# Patient Record
Sex: MALE | Race: WHITE | NOT HISPANIC OR LATINO | Employment: UNEMPLOYED | URBAN - METROPOLITAN AREA
[De-identification: names, ages, dates, MRNs, and addresses within clinical notes are randomized per-mention and may not be internally consistent; named-entity substitution may affect disease eponyms.]

---

## 2022-07-25 ENCOUNTER — LAB REQUISITION (OUTPATIENT)
Dept: LAB | Facility: HOSPITAL | Age: 32
End: 2022-07-25

## 2022-07-25 DIAGNOSIS — R11.2 NAUSEA WITH VOMITING, UNSPECIFIED: ICD-10-CM

## 2022-07-25 DIAGNOSIS — R63.4 ABNORMAL WEIGHT LOSS: ICD-10-CM

## 2022-07-25 DIAGNOSIS — R10.84 GENERALIZED ABDOMINAL PAIN: ICD-10-CM

## 2022-07-25 PROCEDURE — 88342 IMHCHEM/IMCYTCHM 1ST ANTB: CPT | Performed by: INTERNAL MEDICINE

## 2022-07-25 PROCEDURE — 88305 TISSUE EXAM BY PATHOLOGIST: CPT | Performed by: INTERNAL MEDICINE

## 2022-07-26 LAB
LAB AP CASE REPORT: NORMAL
PATH REPORT.FINAL DX SPEC: NORMAL
PATH REPORT.GROSS SPEC: NORMAL

## 2024-04-03 ENCOUNTER — OFFICE VISIT (OUTPATIENT)
Dept: PSYCHIATRY | Facility: CLINIC | Age: 34
End: 2024-04-03
Payer: MEDICAID

## 2024-04-03 ENCOUNTER — PATIENT ROUNDING (BHMG ONLY) (OUTPATIENT)
Dept: PSYCHIATRY | Facility: CLINIC | Age: 34
End: 2024-04-03
Payer: MEDICAID

## 2024-04-03 DIAGNOSIS — F31.81 BIPOLAR II DISORDER: Primary | Chronic | ICD-10-CM

## 2024-04-03 DIAGNOSIS — F41.1 GENERALIZED ANXIETY DISORDER: Chronic | ICD-10-CM

## 2024-04-03 RX ORDER — TRAZODONE HYDROCHLORIDE 100 MG/1
100 TABLET ORAL NIGHTLY PRN
COMMUNITY
Start: 2024-02-19

## 2024-04-03 RX ORDER — GABAPENTIN 600 MG/1
1 TABLET ORAL 3 TIMES DAILY
COMMUNITY
Start: 2024-02-20

## 2024-04-03 RX ORDER — HYDROXYZINE 50 MG/1
TABLET, FILM COATED ORAL
COMMUNITY
Start: 2024-03-05

## 2024-04-03 RX ORDER — MELOXICAM 15 MG/1
TABLET ORAL
COMMUNITY
Start: 2024-03-31

## 2024-04-03 RX ORDER — ARIPIPRAZOLE 5 MG/1
5 TABLET ORAL DAILY
Qty: 30 TABLET | Refills: 0 | Status: SHIPPED | OUTPATIENT
Start: 2024-04-03

## 2024-04-03 RX ORDER — PANTOPRAZOLE SODIUM 40 MG/1
1 TABLET, DELAYED RELEASE ORAL DAILY
COMMUNITY
Start: 2024-01-11

## 2024-04-03 RX ORDER — CITALOPRAM 40 MG/1
1 TABLET ORAL DAILY
COMMUNITY
Start: 2024-01-24

## 2024-04-03 RX ORDER — TIZANIDINE 4 MG/1
TABLET ORAL
COMMUNITY
Start: 2024-03-31

## 2024-04-03 NOTE — PROGRESS NOTES
Subjective   Lior Villalpando is a 34 y.o. male who presents today for initial evaluation     Chief Complaint:  depression anger anxiety     History of Present Illness: the pt reported issues with mood/anger and anxiety since he was a child, mood issues never been addressed and he was self medicating with different drugs    The pt was started on celexa by PCP, no improvement   Depression became worse recently,  rated as 9/10, denied AVH/SI/HI  Depression is associated with low self esteem, low E level, guilt feelings, insomnia  Anxiety is intense and persistent and associated with increased tension, irritability and unpleasant somatic sensations  The pt endorsed past episodes with increase E, irritability, decreased need for sleep, increased impulsivity, racing thoughts that lasted 3-4 days and not related to drug use     The pt experimented with different drugs in the past, was clean for 2 years and relapsed recently on meth  THC - daily use   Meth last use - 1 week ago   Alc - daily     The following portions of the patient's history were reviewed and updated as appropriate: allergies, current medications, past family history, past medical history, past social history, past surgical history and problem list.    PAST PSYCHIATRIC HISTORY  Axis I  No inpt , no attempts  Self mutilation (superficial wrist cut) , last - 2022   Axis II  Defer     PAST OUTPATIENT TREATMENT  Diagnosis treated:  No psych outpt, no psychotherapy    Treatment Type:  Meds from PCP   Prior Psychiatric Medications:  Celexa - now   Vraylar - increased anger and irritability   Support Groups:  Anger management as a teen (court ordered)   Sequelae Of Mental Disorder:  job disruption, family disruption, emotional distress          Interval History  Deteriorated    Side Effects  Denied       Past Medical History:  Past Medical History:   Diagnosis Date    Anxiety     Arrhythmia     Chronic pain disorder     Depression        Social History:  Social  History     Socioeconomic History    Marital status: Single   Tobacco Use    Smoking status: Every Day     Types: Cigarettes    Smokeless tobacco: Never   Vaping Use    Vaping status: Every Day    Substances: Nicotine    Devices: Disposable   Substance and Sexual Activity    Alcohol use: Yes     Alcohol/week: 20.0 standard drinks of alcohol     Types: 20 Cans of beer per week    Drug use: Yes     Types: Marijuana    Sexual activity: Defer   4 children 9, 8,4,3 yo   Unemployed   The pt used to work at the factory or retail in the past   The pt had DUI court coming up   The pt experimented with drugs in the past  Now daily THC and alc use and relapsed on meth recently, last use 1 week ago     Family History:  Family History   Problem Relation Age of Onset    Depression Mother     Anxiety disorder Mother        Past Surgical History:  History reviewed. No pertinent surgical history.    Problem List:  Patient Active Problem List   Diagnosis    Bipolar II disorder       Allergy:   No Known Allergies     Discontinued Medications:  There are no discontinued medications.    Current Medications:   Current Outpatient Medications   Medication Sig Dispense Refill    citalopram (CeleXA) 40 MG tablet Take 1 tablet by mouth Daily.      gabapentin (NEURONTIN) 600 MG tablet Take 1 tablet by mouth 3 times a day.      hydrOXYzine (ATARAX) 50 MG tablet TAKE 2 (TWO) TABLET THREE TIMES DAILY AS NEEDED      meloxicam (MOBIC) 15 MG tablet       pantoprazole (PROTONIX) 40 MG EC tablet Take 1 tablet by mouth Daily.      tiZANidine (ZANAFLEX) 4 MG tablet       traZODone (DESYREL) 100 MG tablet Take 1 tablet by mouth At Night As Needed.      ARIPiprazole (ABILIFY) 5 MG tablet Take 1 tablet by mouth Daily. 30 tablet 0     No current facility-administered medications for this visit.         Psychological ROS: positive for - anxiety, depression, and irritability  negative for - disorientation, hallucinations, memory difficulties, or suicidal  ideation      Physical Exam:   There were no vitals taken for this visit.    Mental Status Exam:   Hygiene:   good  Cooperation:  Cooperative  Eye Contact:  Fair  Psychomotor Behavior:  Appropriate  Affect:  Appropriate and Blunted  Mood: sad, depressed, and anxious  Hopelessness: Denies  Speech:  Normal  Thought Process:  Goal directed and Linear  Thought Content:  Mood congruent  Suicidal:  None  Homicidal:  None  Hallucinations:  None  Delusion:  None  Memory:  Intact  Orientation:  Person, Place, Time, and Situation  Reliability:  fair  Insight:  Fair  Judgement:  Fair  Impulse Control:  Poor  Physical/Medical Issues:  Yes          PHQ-9 Depression Screening    Little interest or pleasure in doing things? 3-->nearly every day   Feeling down, depressed, or hopeless? 3-->nearly every day   Trouble falling or staying asleep, or sleeping too much? 2-->more than half the days   Feeling tired or having little energy? 2-->more than half the days   Poor appetite or overeating? 1-->several days   Feeling bad about yourself - or that you are a failure or have let yourself or your family down? 3-->nearly every day   Trouble concentrating on things, such as reading the newspaper or watching television? 0-->not at all   Moving or speaking so slowly that other people could have noticed? Or the opposite - being so fidgety or restless that you have been moving around a lot more than usual? 0-->not at all   Thoughts that you would be better off dead, or of hurting yourself in some way? 0-->not at all   PHQ-9 Total Score 14   If you checked off any problems, how difficult have these problems made it for you to do your work, take care of things at home, or get along with other people? very difficult      Over the last two weeks, how often have you been bothered by the following problems?  Feeling nervous, anxious or on edge: Several days  Not being able to stop or control worrying: Several days  Worrying too much about different  things: Several days  Trouble Relaxing: More than half the days  Being so restless that it is hard to sit still: Several days  Becoming easily annoyed or irritable: Nearly every day  Feeling afraid as if something awful might happen: More than half the days  ERIC 7 Total Score: 11  If you checked any problems, how difficult have these problems made it for you to do your work, take care of things at home, or get along with other people: Very difficult      Current every day smoker 3-10 mintues spent counseling Not agreeable to stopping    I advised Lior of the risks of tobacco use.     Lab Results:   No visits with results within 3 Month(s) from this visit.   Latest known visit with results is:   Lab Requisition on 07/25/2022   Component Date Value Ref Range Status    Case Report 07/25/2022    Final                    Value:Surgical Pathology Report                         Case: [id number]                                  Authorizing Provider:  Darrick Spaulding MD        Collected:           07/25/2022 09:46 AM          Ordering Location:     [facility]       Received:            07/25/2022 02:06 PM                                 LABORATORY                                                                   Pathologist:           Abner Ramírez MD                                                             Specimen:    Gastric                                                                                    Final Diagnosis 07/25/2022    Final                    Value:This result contains rich text formatting which cannot be displayed here.    Gross Description 07/25/2022    Final                    Value:This result contains rich text formatting which cannot be displayed here.       Assessment & Plan   Problems Addressed this Visit       Bipolar II disorder - Primary    Relevant Medications    citalopram (CeleXA) 40 MG tablet    hydrOXYzine (ATARAX) 50 MG tablet    traZODone (DESYREL) 100 MG tablet     ARIPiprazole (ABILIFY) 5 MG tablet     Other Visit Diagnoses       Generalized anxiety disorder  (Chronic)       Relevant Medications    citalopram (CeleXA) 40 MG tablet    hydrOXYzine (ATARAX) 50 MG tablet    traZODone (DESYREL) 100 MG tablet    ARIPiprazole (ABILIFY) 5 MG tablet          Diagnoses         Codes Comments    Bipolar II disorder    -  Primary ICD-10-CM: F31.81  ICD-9-CM: 296.89     Generalized anxiety disorder     ICD-10-CM: F41.1  ICD-9-CM: 300.02             Visit Diagnoses:    ICD-10-CM ICD-9-CM   1. Bipolar II disorder  F31.81 296.89   2. Generalized anxiety disorder  F41.1 300.02       TREATMENT PLAN/GOALS: Continue supportive psychotherapy efforts and medications as indicated. Treatment and medication options discussed during today's visit. Patient ackowledged and verbally consented to continue with current treatment plan and was educated on the importance of compliance with treatment and follow-up appointments.    MEDICATION ISSUES:  INSPECT reviewed as expected - on gabapentin     PHQ scored 14 - moderate depression   ERIC 7 scored 11   MDQ scored 6   Patient screened positive for depression based on a PHQ-9 score of 14 on 4/3/2024. Follow-up recommendations include: Prescribed antidepressant medication treatment and mood stabilizers  .       Bipolar type 2 - start abilify 5 mg po QAM, consider AVALOS  maintenna or asimtufi to improve compliance   ERIC - cont celexa 40 mg and gabapentin (from PCP for pain)  Alc dependence - decrease amount of alc - harm reduction, consider vivitrol     The pt will benefit from therapy  AA/NA recommended     Short term goals - to establish therapeutic rapport   Long term goals - to improve sxs       Discussed medication options and treatment plan of prescribed medication as well as the risks, benefits, and side effects including potential falls, possible impaired driving and metabolic adversities among others. Patient is agreeable to call the office with any  worsening of symptoms or onset of side effects. Patient is agreeable to call 911 or go to the nearest ER should he/she begin having SI/HI. No medication side effects or related complaints today.     MEDS ORDERED DURING VISIT:  New Medications Ordered This Visit   Medications    ARIPiprazole (ABILIFY) 5 MG tablet     Sig: Take 1 tablet by mouth Daily.     Dispense:  30 tablet     Refill:  0       Return in about 4 weeks (around 5/1/2024).           This document has been electronically signed by Nayana Murritea MD  April 3, 2024 09:59 EDT    Part of this note may be an electronic transcription/translation of spoken language to printed text using the Dragon Dictation System.

## 2024-04-03 NOTE — PROGRESS NOTES
A My-chart message has been sent to the patient for PATIENT ROUNDING with [facility].

## 2024-04-12 DIAGNOSIS — F31.81 BIPOLAR II DISORDER: Chronic | ICD-10-CM

## 2024-04-12 RX ORDER — ARIPIPRAZOLE 5 MG/1
5 TABLET ORAL DAILY
Qty: 30 TABLET | Refills: 0 | Status: SHIPPED | OUTPATIENT
Start: 2024-04-12

## 2024-05-28 ENCOUNTER — TELEPHONE (OUTPATIENT)
Dept: PSYCHIATRY | Facility: CLINIC | Age: 34
End: 2024-05-28
Payer: MEDICAID

## 2024-05-28 DIAGNOSIS — F31.81 BIPOLAR II DISORDER: Chronic | ICD-10-CM

## 2024-05-29 RX ORDER — ARIPIPRAZOLE 5 MG/1
5 TABLET ORAL DAILY
Qty: 90 TABLET | Refills: 0 | Status: SHIPPED | OUTPATIENT
Start: 2024-05-29

## 2024-08-06 DIAGNOSIS — F31.81 BIPOLAR II DISORDER: Chronic | ICD-10-CM

## 2024-08-06 RX ORDER — ARIPIPRAZOLE 5 MG/1
5 TABLET ORAL DAILY
Qty: 30 TABLET | Refills: 0 | Status: SHIPPED | OUTPATIENT
Start: 2024-08-06

## 2024-09-04 ENCOUNTER — TELEPHONE (OUTPATIENT)
Dept: PSYCHIATRY | Facility: CLINIC | Age: 34
End: 2024-09-04
Payer: MEDICAID

## 2024-09-04 DIAGNOSIS — F31.81 BIPOLAR II DISORDER: Chronic | ICD-10-CM

## 2025-07-23 ENCOUNTER — HOSPITAL ENCOUNTER (OUTPATIENT)
Facility: HOSPITAL | Age: 35
Discharge: HOME | End: 2025-07-23
Attending: EMERGENCY MEDICINE | Admitting: EMERGENCY MEDICINE
Payer: MEDICAID

## 2025-07-23 ENCOUNTER — APPOINTMENT (OUTPATIENT)
Dept: GENERAL RADIOLOGY | Facility: HOSPITAL | Age: 35
End: 2025-07-23
Payer: MEDICAID

## 2025-07-23 VITALS
WEIGHT: 218.6 LBS | TEMPERATURE: 97.9 F | BODY MASS INDEX: 33.13 KG/M2 | HEART RATE: 76 BPM | DIASTOLIC BLOOD PRESSURE: 86 MMHG | SYSTOLIC BLOOD PRESSURE: 135 MMHG | OXYGEN SATURATION: 98 % | HEIGHT: 68 IN | RESPIRATION RATE: 18 BRPM

## 2025-07-23 DIAGNOSIS — R07.89 CHEST WALL PAIN: Primary | ICD-10-CM

## 2025-07-23 LAB
FLUAV SUBTYP SPEC NAA+PROBE: NOT DETECTED
FLUBV RNA NPH QL NAA+NON-PROBE: NOT DETECTED
QT INTERVAL: 391 MS
QTC INTERVAL: 408 MS
SARS-COV-2 RNA RESP QL NAA+PROBE: NOT DETECTED

## 2025-07-23 PROCEDURE — 93005 ELECTROCARDIOGRAM TRACING: CPT | Performed by: EMERGENCY MEDICINE

## 2025-07-23 PROCEDURE — G0463 HOSPITAL OUTPT CLINIC VISIT: HCPCS | Performed by: EMERGENCY MEDICINE

## 2025-07-23 PROCEDURE — 71046 X-RAY EXAM CHEST 2 VIEWS: CPT

## 2025-07-23 PROCEDURE — 87636 SARSCOV2 & INF A&B AMP PRB: CPT | Performed by: EMERGENCY MEDICINE

## 2025-07-23 NOTE — FSED PROVIDER NOTE
Subjective   History of Present Illness  Pt presents with several days of central chest pain that hurts to breathe or move, he thinks he was sunburned on his upper chest, mild cough/congestion and pt does smoke, he thinks he has hx of heart murmur but no pulmonary issues, no n/v/d or abd pain and maranda po well, alleviated by rest    History provided by:  Patient   used: No        Review of Systems   Constitutional: Negative.    HENT:  Positive for congestion.    Cardiovascular:  Positive for chest pain.   All other systems reviewed and are negative.      Past Medical History:   Diagnosis Date    Anxiety     Arrhythmia     Chronic pain disorder     Depression        Allergies   Allergen Reactions    Metoclopramide Palpitations    Nsaids Rash     swelling of spleen and liver       History reviewed. No pertinent surgical history.    Family History   Problem Relation Age of Onset    Depression Mother     Anxiety disorder Mother        Social History     Socioeconomic History    Marital status: Single   Tobacco Use    Smoking status: Every Day     Types: Cigarettes    Smokeless tobacco: Never   Vaping Use    Vaping status: Every Day    Substances: Nicotine    Devices: Disposable   Substance and Sexual Activity    Alcohol use: Yes     Alcohol/week: 20.0 standard drinks of alcohol     Types: 20 Cans of beer per week    Drug use: Yes     Types: Marijuana    Sexual activity: Defer           Objective   Physical Exam  Vitals and nursing note reviewed.   HENT:      Head: Normocephalic.   Cardiovascular:      Rate and Rhythm: Normal rate.   Pulmonary:      Effort: Pulmonary effort is normal.   Chest:      Chest wall: Tenderness present.      Comments: Central upper  Abdominal:      Palpations: Abdomen is soft.   Musculoskeletal:         General: Normal range of motion.      Cervical back: Normal range of motion.   Skin:     General: Skin is warm.   Neurological:      General: No focal deficit present.       Mental Status: He is alert.   Psychiatric:         Mood and Affect: Mood normal.         ECG 12 Lead      Date/Time: 7/23/2025 12:51 PM    Performed by: Raf Anderson MD  Authorized by: Raf Anderson MD  Interpreted by ED physician  Rhythm: sinus rhythm  Rate: normal  BPM: 65  QRS axis: normal  Conduction: conduction normal  ST Segments: ST segments normal  T Waves: T waves normal  Other findings: early repolarization  Clinical impression: non-specific ECG               ED Course                                           Medical Decision Making  Chest xr shows no active disease  - viral panel  Pain is ms and reproducible, non anginal pain    Amount and/or Complexity of Data Reviewed  Radiology: ordered. Decision-making details documented in ED Course.  ECG/medicine tests: ordered. Decision-making details documented in ED Course.        Final diagnoses:   Chest wall pain       ED Disposition  ED Disposition       ED Disposition   Discharge    Condition   Stable    Comment   --               Cassie Marrufo MD  [address] [place] [address] [place] [address]  [phone]    In 3 days  If symptoms worsen         Medication List      No changes were made to your prescriptions during this visit.

## 2025-07-23 NOTE — Clinical Note
[facility] [place] [id number] [place] [address] [place] [address]  Phone: [phone]    Lior Villalpando was seen and treated in our emergency department on 7/23/2025.  He may return to work on 07/24/2025.         Thank you for choosing [facility].    Raf Anderson MD

## 2025-07-26 ENCOUNTER — HOSPITAL ENCOUNTER (OUTPATIENT)
Facility: HOSPITAL | Age: 35
Discharge: HOME | End: 2025-07-26
Attending: EMERGENCY MEDICINE | Admitting: EMERGENCY MEDICINE
Payer: MEDICAID

## 2025-07-26 ENCOUNTER — APPOINTMENT (OUTPATIENT)
Dept: GENERAL RADIOLOGY | Facility: HOSPITAL | Age: 35
End: 2025-07-26
Payer: MEDICAID

## 2025-07-26 VITALS
WEIGHT: 218 LBS | HEART RATE: 86 BPM | RESPIRATION RATE: 18 BRPM | HEIGHT: 68 IN | BODY MASS INDEX: 33.04 KG/M2 | SYSTOLIC BLOOD PRESSURE: 142 MMHG | TEMPERATURE: 98.1 F | DIASTOLIC BLOOD PRESSURE: 76 MMHG | OXYGEN SATURATION: 95 %

## 2025-07-26 DIAGNOSIS — L03.115 CELLULITIS OF RIGHT FOOT: Primary | ICD-10-CM

## 2025-07-26 PROCEDURE — 73630 X-RAY EXAM OF FOOT: CPT

## 2025-07-26 PROCEDURE — G0463 HOSPITAL OUTPT CLINIC VISIT: HCPCS

## 2025-07-26 RX ORDER — DOXYCYCLINE 100 MG/1
100 CAPSULE ORAL 2 TIMES DAILY
Qty: 20 CAPSULE | Refills: 0 | Status: SHIPPED | OUTPATIENT
Start: 2025-07-26 | End: 2025-08-05

## 2025-07-26 NOTE — FSED PROVIDER NOTE
Subjective   History of Present Illness  35-year-old male reports a 2-day history of right foot pain he reports that the pain is to the top of his right foot he reports that he works at [employer] and has to stand for long periods of time.  Denies any known injury or trauma.  He does report a history of cellulitis        Review of Systems   All other systems reviewed and are negative.      Past Medical History:   Diagnosis Date    Anxiety     Arrhythmia     Chronic pain disorder     Depression        Allergies   Allergen Reactions    Metoclopramide Palpitations    Nsaids Rash     swelling of spleen and liver       No past surgical history on file.    Family History   Problem Relation Age of Onset    Depression Mother     Anxiety disorder Mother        Social History     Socioeconomic History    Marital status: Single   Tobacco Use    Smoking status: Every Day     Types: Cigarettes    Smokeless tobacco: Never   Vaping Use    Vaping status: Every Day    Substances: Nicotine    Devices: Disposable   Substance and Sexual Activity    Alcohol use: Yes     Alcohol/week: 20.0 standard drinks of alcohol     Types: 20 Cans of beer per week    Drug use: Yes     Types: Marijuana    Sexual activity: Defer           Objective   Physical Exam  Vitals and nursing note reviewed.   Constitutional:       General: He is not in acute distress.     Appearance: Normal appearance. He is not toxic-appearing.   HENT:      Head: Normocephalic and atraumatic.      Mouth/Throat:      Mouth: Mucous membranes are moist.      Pharynx: Oropharynx is clear.   Eyes:      Extraocular Movements: Extraocular movements intact.      Pupils: Pupils are equal, round, and reactive to light.   Cardiovascular:      Rate and Rhythm: Normal rate.      Pulses: Normal pulses.   Pulmonary:      Effort: Pulmonary effort is normal.      Breath sounds: Normal breath sounds.   Abdominal:      General: Abdomen is flat. Bowel sounds are normal.   Musculoskeletal:          General: Normal range of motion.      Cervical back: Normal range of motion.   Skin:     General: Skin is warm and dry.      Capillary Refill: Capillary refill takes less than 2 seconds.      Comments: Top of the patient's right foot beginning at the base of the toes and extending to the midfoot region, there is a well-circumscribed area of redness that is warm to touch there is no abscess formation.    Patient's right great toe does not appear red or swollen.   Neurological:      General: No focal deficit present.      Mental Status: He is alert and oriented to person, place, and time.         Procedures           ED Course  ED Course as of 07/26/25 1806   Sat Jul 26, 2025   1757 X-ray right foot  Impression:  No acute radiographic abnormality of the right foot.   [WF]      ED Course User Index  [WF] Jason Yarbrough Jr., APRN                                           Medical Decision Making  X-ray pending of the right foot presentation appears more consistent with cellulitis    X-ray of the right foot is negative for acute bony abnormality    Will discharge patient with doxycycline for treatment of right foot cellulitis    Problems Addressed:  Cellulitis of right foot: complicated acute illness or injury    Amount and/or Complexity of Data Reviewed  Radiology: ordered.    Risk  Prescription drug management.        Final diagnoses:   Cellulitis of right foot       ED Disposition  ED Disposition       ED Disposition   Discharge    Condition   Stable    Comment   --               Cassie Marrufo MD  [address] [address] [phone]               Medication List        New Prescriptions      doxycycline 100 MG capsule  Commonly known as: MONODOX  Take 1 capsule by mouth 2 (Two) Times a Day for 10 days.               Where to Get Your Medications        These medications were sent to [facility]/pharmacy #[id number] - [place], [place] - [address] [place] [address] - [phone]  - [phone]    [address] [place] [address] [place] [address]      Hours: 24-hours Phone: [phone]   doxycycline 100 MG capsule

## 2025-07-26 NOTE — DISCHARGE INSTRUCTIONS
Begin doxycycline for treatment of right foot cellulitis    You should see improvement in your foot pain and the redness and warmth of the right foot within the next 2 to 3 days    For worsening symptoms return to

## 2025-07-30 ENCOUNTER — HOSPITAL ENCOUNTER (EMERGENCY)
Facility: HOSPITAL | Age: 35
Discharge: ANOTHER HOSPITAL | End: 2025-07-30
Attending: EMERGENCY MEDICINE | Admitting: STUDENT IN AN ORGANIZED HEALTH CARE EDUCATION/TRAINING PROGRAM
Payer: MEDICAID

## 2025-07-30 ENCOUNTER — NURSE TRIAGE (OUTPATIENT)
Dept: CALL CENTER | Facility: HOSPITAL | Age: 35
End: 2025-07-30
Payer: MEDICAID

## 2025-07-30 ENCOUNTER — APPOINTMENT (OUTPATIENT)
Dept: GENERAL RADIOLOGY | Facility: HOSPITAL | Age: 35
End: 2025-07-30
Payer: MEDICAID

## 2025-07-30 ENCOUNTER — ANESTHESIA (OUTPATIENT)
Dept: EMERGENCY DEPT | Facility: HOSPITAL | Age: 35
End: 2025-07-30
Payer: MEDICAID

## 2025-07-30 ENCOUNTER — ANESTHESIA EVENT (OUTPATIENT)
Dept: EMERGENCY DEPT | Facility: HOSPITAL | Age: 35
End: 2025-07-30
Payer: MEDICAID

## 2025-07-30 VITALS
DIASTOLIC BLOOD PRESSURE: 74 MMHG | RESPIRATION RATE: 18 BRPM | BODY MASS INDEX: 33.15 KG/M2 | HEIGHT: 68 IN | OXYGEN SATURATION: 98 % | TEMPERATURE: 98.7 F | WEIGHT: 218.7 LBS | SYSTOLIC BLOOD PRESSURE: 122 MMHG | HEART RATE: 76 BPM

## 2025-07-30 DIAGNOSIS — M00.9 SEPTIC ARTHRITIS, DUE TO UNSPECIFIED ORGANISM, SEPTIC ARTHRITIS OF UNSPECIFIED LOCATION: Primary | ICD-10-CM

## 2025-07-30 LAB
APPEARANCE FLD: ABNORMAL
BASOPHILS # BLD AUTO: 0.04 10*3/MM3 (ref 0–0.2)
BASOPHILS NFR BLD AUTO: 0.3 % (ref 0–1.5)
BUN BLDA-MCNC: 17 MG/DL (ref 8–26)
CA-I BLDA-SCNC: 1.18 MMOL/L (ref 1.15–1.33)
CHLORIDE BLDA-SCNC: 101 MMOL/L (ref 98–109)
CO2 BLDA-SCNC: 28.1 MMOL/L (ref 23–27)
COLOR FLD: YELLOW
CREAT BLDA-MCNC: 0.86 MG/DL (ref 0.6–1.3)
CRP SERPL-MCNC: 22.9 MG/DL (ref 0–0.5)
DEPRECATED RDW RBC AUTO: 41.1 FL (ref 37–54)
EOSINOPHIL # BLD AUTO: 0.15 10*3/MM3 (ref 0–0.4)
EOSINOPHIL NFR BLD AUTO: 1.2 % (ref 0.3–6.2)
ERYTHROCYTE [DISTWIDTH] IN BLOOD BY AUTOMATED COUNT: 12 % (ref 12.3–15.4)
ERYTHROCYTE [SEDIMENTATION RATE] IN BLOOD: 43 MM/HR (ref 0–15)
GLUCOSE BLDC GLUCOMTR-MCNC: 90 MG/DL (ref 74–100)
HCT VFR BLD AUTO: 40.2 % (ref 37.5–51)
HGB BLD-MCNC: 13.3 G/DL (ref 13–17.7)
IMM GRANULOCYTES # BLD AUTO: 0.05 10*3/MM3 (ref 0–0.05)
IMM GRANULOCYTES NFR BLD AUTO: 0.4 % (ref 0–0.5)
LYMPHOCYTES # BLD AUTO: 3.07 10*3/MM3 (ref 0.7–3.1)
LYMPHOCYTES NFR BLD AUTO: 24.2 % (ref 19.6–45.3)
LYMPHOCYTES NFR FLD MANUAL: 3 %
MCH RBC QN AUTO: 30.1 PG (ref 26.6–33)
MCHC RBC AUTO-ENTMCNC: 33.1 G/DL (ref 31.5–35.7)
MCV RBC AUTO: 91 FL (ref 79–97)
METHOD: ABNORMAL
MONOCYTES # BLD AUTO: 1.69 10*3/MM3 (ref 0.1–0.9)
MONOCYTES NFR BLD AUTO: 13.3 % (ref 5–12)
MONOCYTES NFR FLD: 5 %
NEUTROPHILS NFR BLD AUTO: 60.6 % (ref 42.7–76)
NEUTROPHILS NFR BLD AUTO: 7.71 10*3/MM3 (ref 1.7–7)
NEUTROPHILS NFR FLD MANUAL: 92 %
NUC CELL # FLD: ABNORMAL /MM3
PLATELET # BLD AUTO: 369 10*3/MM3 (ref 140–450)
PMV BLD AUTO: 8.5 FL (ref 6–12)
POTASSIUM BLDA-SCNC: 3.7 MMOL/L (ref 3.5–4.5)
RBC # BLD AUTO: 4.42 10*6/MM3 (ref 4.14–5.8)
SODIUM BLD-SCNC: 139 MMOL/L (ref 138–146)
WBC NRBC COR # BLD AUTO: 12.71 10*3/MM3 (ref 3.4–10.8)

## 2025-07-30 PROCEDURE — 25010000002 DIPHENHYDRAMINE PER 50 MG: Performed by: EMERGENCY MEDICINE

## 2025-07-30 PROCEDURE — 84560 ASSAY OF URINE/URIC ACID: CPT | Performed by: EMERGENCY MEDICINE

## 2025-07-30 PROCEDURE — 25010000002 ONDANSETRON PER 1 MG: Performed by: EMERGENCY MEDICINE

## 2025-07-30 PROCEDURE — 96375 TX/PRO/DX INJ NEW DRUG ADDON: CPT

## 2025-07-30 PROCEDURE — 87070 CULTURE OTHR SPECIMN AEROBIC: CPT | Performed by: EMERGENCY MEDICINE

## 2025-07-30 PROCEDURE — 99285 EMERGENCY DEPT VISIT HI MDM: CPT

## 2025-07-30 PROCEDURE — 89051 BODY FLUID CELL COUNT: CPT | Performed by: EMERGENCY MEDICINE

## 2025-07-30 PROCEDURE — 25010000002 PIPERACILLIN SOD-TAZOBACTAM PER 1 G: Performed by: EMERGENCY MEDICINE

## 2025-07-30 PROCEDURE — 99285 EMERGENCY DEPT VISIT HI MDM: CPT | Performed by: EMERGENCY MEDICINE

## 2025-07-30 PROCEDURE — 73562 X-RAY EXAM OF KNEE 3: CPT

## 2025-07-30 PROCEDURE — 36415 COLL VENOUS BLD VENIPUNCTURE: CPT

## 2025-07-30 PROCEDURE — 85652 RBC SED RATE AUTOMATED: CPT | Performed by: EMERGENCY MEDICINE

## 2025-07-30 PROCEDURE — 20611 DRAIN/INJ JOINT/BURSA W/US: CPT | Performed by: EMERGENCY MEDICINE

## 2025-07-30 PROCEDURE — 96376 TX/PRO/DX INJ SAME DRUG ADON: CPT

## 2025-07-30 PROCEDURE — 85025 COMPLETE CBC W/AUTO DIFF WBC: CPT | Performed by: EMERGENCY MEDICINE

## 2025-07-30 PROCEDURE — 80047 BASIC METABLC PNL IONIZED CA: CPT

## 2025-07-30 PROCEDURE — 96365 THER/PROPH/DIAG IV INF INIT: CPT

## 2025-07-30 PROCEDURE — 86140 C-REACTIVE PROTEIN: CPT | Performed by: EMERGENCY MEDICINE

## 2025-07-30 PROCEDURE — 25010000002 MORPHINE PER 10 MG: Performed by: EMERGENCY MEDICINE

## 2025-07-30 PROCEDURE — 87205 SMEAR GRAM STAIN: CPT | Performed by: EMERGENCY MEDICINE

## 2025-07-30 PROCEDURE — 87040 BLOOD CULTURE FOR BACTERIA: CPT | Performed by: EMERGENCY MEDICINE

## 2025-07-30 RX ORDER — ACETAMINOPHEN 160 MG/5ML
650 SOLUTION ORAL EVERY 4 HOURS PRN
Status: CANCELLED | OUTPATIENT
Start: 2025-07-30

## 2025-07-30 RX ORDER — AMOXICILLIN 250 MG
2 CAPSULE ORAL 2 TIMES DAILY
Status: CANCELLED | OUTPATIENT
Start: 2025-07-30

## 2025-07-30 RX ORDER — POLYETHYLENE GLYCOL 3350 17 G/17G
17 POWDER, FOR SOLUTION ORAL DAILY PRN
Status: CANCELLED | OUTPATIENT
Start: 2025-07-30

## 2025-07-30 RX ORDER — BISACODYL 5 MG/1
5 TABLET, DELAYED RELEASE ORAL DAILY PRN
Status: CANCELLED | OUTPATIENT
Start: 2025-07-30

## 2025-07-30 RX ORDER — SODIUM CHLORIDE 0.9 % (FLUSH) 0.9 %
10 SYRINGE (ML) INJECTION AS NEEDED
Status: CANCELLED | OUTPATIENT
Start: 2025-07-30

## 2025-07-30 RX ORDER — ACETAMINOPHEN 325 MG/1
650 TABLET ORAL EVERY 4 HOURS PRN
Status: CANCELLED | OUTPATIENT
Start: 2025-07-30

## 2025-07-30 RX ORDER — SODIUM CHLORIDE 0.9 % (FLUSH) 0.9 %
10 SYRINGE (ML) INJECTION EVERY 12 HOURS SCHEDULED
Status: CANCELLED | OUTPATIENT
Start: 2025-07-30

## 2025-07-30 RX ORDER — ACETAMINOPHEN 650 MG/1
650 SUPPOSITORY RECTAL EVERY 4 HOURS PRN
Status: CANCELLED | OUTPATIENT
Start: 2025-07-30

## 2025-07-30 RX ORDER — ONDANSETRON 2 MG/ML
4 INJECTION INTRAMUSCULAR; INTRAVENOUS ONCE
Status: COMPLETED | OUTPATIENT
Start: 2025-07-30 | End: 2025-07-30

## 2025-07-30 RX ORDER — DIPHENHYDRAMINE HYDROCHLORIDE 50 MG/ML
25 INJECTION, SOLUTION INTRAMUSCULAR; INTRAVENOUS ONCE
Status: COMPLETED | OUTPATIENT
Start: 2025-07-30 | End: 2025-07-30

## 2025-07-30 RX ORDER — SODIUM CHLORIDE 9 MG/ML
40 INJECTION, SOLUTION INTRAVENOUS AS NEEDED
Status: CANCELLED | OUTPATIENT
Start: 2025-07-30

## 2025-07-30 RX ORDER — OXYCODONE HYDROCHLORIDE 5 MG/1
5 TABLET ORAL ONCE
Refills: 0 | Status: CANCELLED | OUTPATIENT
Start: 2025-07-30 | End: 2025-07-30

## 2025-07-30 RX ORDER — BISACODYL 10 MG
10 SUPPOSITORY, RECTAL RECTAL DAILY PRN
Status: CANCELLED | OUTPATIENT
Start: 2025-07-30

## 2025-07-30 RX ORDER — SODIUM CHLORIDE, SODIUM LACTATE, POTASSIUM CHLORIDE, CALCIUM CHLORIDE 600; 310; 30; 20 MG/100ML; MG/100ML; MG/100ML; MG/100ML
9 INJECTION, SOLUTION INTRAVENOUS CONTINUOUS PRN
Status: CANCELLED | OUTPATIENT
Start: 2025-07-30 | End: 2025-07-31

## 2025-07-30 RX ADMIN — ONDANSETRON 4 MG: 2 INJECTION, SOLUTION INTRAMUSCULAR; INTRAVENOUS at 18:28

## 2025-07-30 RX ADMIN — DIPHENHYDRAMINE HYDROCHLORIDE 25 MG: 50 INJECTION INTRAMUSCULAR; INTRAVENOUS at 19:24

## 2025-07-30 RX ADMIN — MORPHINE SULFATE 4 MG: 4 INJECTION, SOLUTION INTRAMUSCULAR; INTRAVENOUS at 19:24

## 2025-07-30 RX ADMIN — PIPERACILLIN AND TAZOBACTAM 3.38 G: 3; .375 INJECTION, POWDER, FOR SOLUTION INTRAVENOUS at 14:45

## 2025-07-30 RX ADMIN — MORPHINE SULFATE 4 MG: 4 INJECTION, SOLUTION INTRAMUSCULAR; INTRAVENOUS at 18:04

## 2025-07-30 NOTE — FSED PROVIDER NOTE
Time: 4:11 PM EDT  Date of encounter:  7/30/2025  Independent Historian/Clinical History and Information was obtained by:   Patient    History is limited by: N/A    Chief Complaint: Knee pain      History of Present Illness:  Patient is a 35 y.o. year old male who presents to the emergency department for evaluation of knee pain that has gotten worse over the past day.  Patient reports significant swelling and inability to move the knee at the joint.  Patient denies trauma.  Patient was recently treated for cellulitis of the right foot with doxycycline.  Patient reports subjective fever with no chills.      Patient Care Team  Primary Care Provider: Cassie Marrufo MD    Past Medical History:     Allergies   Allergen Reactions    Ibuprofen Rash    Metoclopramide Palpitations    Nsaids Rash     swelling of spleen and liver     Past Medical History:   Diagnosis Date    Anxiety     Arrhythmia     Chronic pain disorder     Depression      No past surgical history on file.  Family History   Problem Relation Age of Onset    Depression Mother     Anxiety disorder Mother        Home Medications:  Prior to Admission medications    Medication Sig Start Date End Date Taking? Authorizing Provider   ARIPiprazole (ABILIFY) 5 MG tablet TAKE 1 TABLET BY MOUTH EVERY DAY 8/6/24   Nayana Murrieta MD   citalopram (CeleXA) 40 MG tablet Take 1 tablet by mouth Daily. 1/24/24   Lorenzo Pineda MD   doxycycline (MONODOX) 100 MG capsule Take 1 capsule by mouth 2 (Two) Times a Day for 10 days. 7/26/25 8/5/25  Jason Yarbrough Jr., APRN   gabapentin (NEURONTIN) 600 MG tablet Take 1 tablet by mouth 3 times a day. 2/20/24   Lorenzo Pineda MD   hydrOXYzine (ATARAX) 50 MG tablet TAKE 2 (TWO) TABLET THREE TIMES DAILY AS NEEDED 3/5/24   Lorenzo Pineda MD   meloxicam (MOBIC) 15 MG tablet  3/31/24   Lorenzo Pineda MD   pantoprazole (PROTONIX) 40 MG EC tablet Take 1 tablet by mouth Daily. 1/11/24   Edwin  "MD Lorenzo   tiZANidine (ZANAFLEX) 4 MG tablet  3/31/24   ProviderLorenzo MD   traZODone (DESYREL) 100 MG tablet Take 1 tablet by mouth At Night As Needed. 2/19/24   ProviderLorenzo MD        Social History:   Social History     Tobacco Use    Smoking status: Every Day     Types: Cigarettes    Smokeless tobacco: Never   Vaping Use    Vaping status: Every Day    Substances: Nicotine    Devices: Disposable   Substance Use Topics    Alcohol use: Yes     Alcohol/week: 20.0 standard drinks of alcohol     Types: 20 Cans of beer per week    Drug use: Yes     Types: Marijuana         Review of Systems:  Review of Systems   Constitutional:  Negative for chills and fever.   HENT:  Negative for congestion, rhinorrhea and sore throat.    Eyes:  Negative for pain and visual disturbance.   Respiratory:  Negative for apnea, cough, chest tightness and shortness of breath.    Cardiovascular:  Negative for chest pain and palpitations.   Gastrointestinal:  Negative for abdominal pain, diarrhea, nausea and vomiting.   Genitourinary:  Negative for difficulty urinating and dysuria.   Musculoskeletal:  Positive for joint swelling. Negative for myalgias.   Skin:  Negative for color change.   Neurological:  Negative for seizures and headaches.   Psychiatric/Behavioral: Negative.     All other systems reviewed and are negative.       Physical Exam:  /92   Pulse 77   Temp 98.8 °F (37.1 °C) (Oral)   Resp 18   Ht 172.7 cm (68\")   Wt 99.2 kg (218 lb 11.2 oz)   SpO2 95%   BMI 33.25 kg/m²     Physical Exam  Vitals and nursing note reviewed.   Constitutional:       General: He is not in acute distress.     Appearance: Normal appearance. He is not toxic-appearing.   HENT:      Head: Normocephalic and atraumatic.      Jaw: There is normal jaw occlusion.   Eyes:      General: Lids are normal.      Extraocular Movements: Extraocular movements intact.      Conjunctiva/sclera: Conjunctivae normal.      Pupils: Pupils are " equal, round, and reactive to light.   Cardiovascular:      Rate and Rhythm: Normal rate and regular rhythm.      Pulses: Normal pulses.      Heart sounds: Normal heart sounds.   Pulmonary:      Effort: Pulmonary effort is normal. No respiratory distress.      Breath sounds: Normal breath sounds. No wheezing or rhonchi.   Abdominal:      General: Abdomen is flat.      Palpations: Abdomen is soft.      Tenderness: There is no abdominal tenderness. There is no guarding or rebound.   Musculoskeletal:         General: Normal range of motion.      Cervical back: Normal range of motion and neck supple.      Right lower leg: No edema.      Left lower leg: No edema.      Comments: (+) Right knee tenderness with swelling and warmth   Skin:     General: Skin is warm and dry.   Neurological:      Mental Status: He is alert and oriented to person, place, and time. Mental status is at baseline.   Psychiatric:         Mood and Affect: Mood normal.                    Medical Decision Making:      Comorbidities that affect care:    None    External Notes reviewed:    Previous ED Note: Patient was last seen in the ED and given doxycycline for cellulitis.      The following orders were placed and all results were independently analyzed by me:  Orders Placed This Encounter   Procedures    Arthocentesis    Body Fluid Culture - Body Fluid, Knee, Right    Blood Culture - Blood,    Blood Culture - Blood,    XR Knee 3 View Right    Body Fluid Cell Count With Differential - Body Fluid, Knee, Right    Uric Acid, Body Fluid - Body Fluid, Knee, Right    CBC Auto Differential    Body fluid cell count - Body Fluid, Knee, Right    Sedimentation Rate    C-reactive Protein    Body fluid differential - Body Fluid, Knee, Right    Ortho (on-call MD unless specified)    Hospitalist (on-call MD unless specified)    POC Chem Panel    CBC & Differential    ED Acknowledgement Form Needed;       Medications Given in the Emergency Department:  Medications    piperacillin-tazobactam (ZOSYN) 3.375 g IVPB in 100 mL NS MBP (CD) (0 g Intravenous Stopped 7/30/25 1525)        ED Course:         Labs:    Lab Results (last 24 hours)       Procedure Component Value Units Date/Time    Body Fluid Culture - Body Fluid, Knee, Right [[id number]] Collected: 07/30/25 1348    Specimen: Body Fluid from Knee, Right Updated: 07/30/25 1453     Gram Stain Many (4+) WBCs per low power field      No organisms seen    Body Fluid Cell Count With Differential - Body Fluid, Knee, Right [[id number]]  (Abnormal) Collected: 07/30/25 1348    Specimen: Body Fluid from Knee, Right Updated: 07/30/25 1544    Narrative:      The following orders were created for panel order Body Fluid Cell Count With Differential - Body Fluid, Knee, Right.  Procedure                               Abnormality         Status                     ---------                               -----------         ------                     Body fluid cell count - ...[[id number]]  Abnormal            Final result               Body fluid differential ...[[id number]]                      In process                   Please view results for these tests on the individual orders.    Uric Acid, Body Fluid - Body Fluid, Knee, Right [[id number]] Collected: 07/30/25 1348    Specimen: Body Fluid from Knee, Right Updated: 07/30/25 1349    Body fluid cell count - Body Fluid, Knee, Right [[id number]]  (Abnormal) Collected: 07/30/25 1348    Specimen: Body Fluid from Knee, Right Updated: 07/30/25 1544     Color, Fluid Yellow     Appearance, Fluid Turbid     Nucleated Cells, Fluid 63,680 /mm3      Method: Automated Sysmex XN Method    Narrative:      No reference range established. Physician to interpret results with clinical findings.  Differential not indicated.  Differential not indicated.    Body fluid differential - Body Fluid, Knee, Right [[id number]] Collected: 07/30/25 1348    Specimen: Body Fluid from Knee, Right Updated: 07/30/25 1531    CBC &  Differential [[id number]]  (Abnormal) Collected: 07/30/25 1403    Specimen: Blood Updated: 07/30/25 1415    Narrative:      The following orders were created for panel order CBC & Differential.  Procedure                               Abnormality         Status                     ---------                               -----------         ------                     CBC Auto Differential[[id number]]        Abnormal            Final result                 Please view results for these tests on the individual orders.    CBC Auto Differential [[id number]]  (Abnormal) Collected: 07/30/25 1403    Specimen: Blood Updated: 07/30/25 1415     WBC 12.71 10*3/mm3      RBC 4.42 10*6/mm3      Hemoglobin 13.3 g/dL      Hematocrit 40.2 %      MCV 91.0 fL      MCH 30.1 pg      MCHC 33.1 g/dL      RDW 12.0 %      RDW-SD 41.1 fl      MPV 8.5 fL      Platelets 369 10*3/mm3      Neutrophil % 60.6 %      Lymphocyte % 24.2 %      Monocyte % 13.3 %      Eosinophil % 1.2 %      Basophil % 0.3 %      Immature Grans % 0.4 %      Neutrophils, Absolute 7.71 10*3/mm3      Lymphocytes, Absolute 3.07 10*3/mm3      Monocytes, Absolute 1.69 10*3/mm3      Eosinophils, Absolute 0.15 10*3/mm3      Basophils, Absolute 0.04 10*3/mm3      Immature Grans, Absolute 0.05 10*3/mm3     Sedimentation Rate [[id number]] Collected: 07/30/25 1403    Specimen: Blood Updated: 07/30/25 1427    C-reactive Protein [[id number]] Collected: 07/30/25 1403    Specimen: Blood Updated: 07/30/25 1427    POC Chem Panel [[id number]]  (Abnormal) Collected: 07/30/25 1414    Specimen: Venous Blood Updated: 07/30/25 1418     Glucose 90 mg/dL      Comment: Serial Number: [id number]:  [id number]        Sodium 139 mmol/L      POC Potassium 3.7 mmol/L      Ionized Calcium 1.18 mmol/L      Chloride 101 mmol/L      Creatinine 0.86 mg/dL      BUN 17 mg/dL      CO2 Content 28.1 mmol/L     Blood Culture - Blood, Arm, Left [[id number]] Collected: 07/30/25 1443    Specimen: Blood from Arm, Left  Updated: 07/30/25 1448    Blood Culture - Blood, Arm, Right [[id number]] Collected: 07/30/25 1445    Specimen: Blood from Arm, Right Updated: 07/30/25 1448             Imaging:    XR Knee 3 View Right  Result Date: 7/30/2025  XR KNEE 3 VW RIGHT Date of Exam: 7/30/2025 12:56 PM EDT Indication: knee is locked up pain and swelling Comparison: None available. Findings: The medial compartment and lateral compartment are maintained in alignment. No fracture or dislocation. The patella is intact. Small joint effusion.     Impression: Negative for acute osseous abnormality. Electronically Signed: Rk Ace MD  7/30/2025 1:24 PM EDT  Workstation ID: [id number]        Differential Diagnosis and Discussion:    Joint Pain: Differential diagnosis includes but is not limited to polyarticular arthritis, gout, tendinitis, hemarthrosis, septic arthritis, rheumatoid arthritis, bursitis, degenerative joint disease, joint effusion, autoimmune disorder, trauma, and occult neoplasm.    PROCEDURES:    Labs were collected in the emergency department and all labs were reviewed and interpreted by me.  X-ray were performed in the emergency department and all X-ray impressions were independently interpreted by me.    No orders to display       Arthocentesis    Date/Time: 7/30/2025 4:13 PM    Performed by: Ashok Tellez MD  Authorized by: Ashok Tellez MD    Consent:     Consent obtained:  Verbal    Consent given by:  Patient    Risks discussed:  Infection and bleeding  Universal protocol:     Patient identity confirmed:  Verbally with patient  Location:     Location:  Knee  Procedure details:     Needle gauge:  18 G    Ultrasound guidance: yes      Approach:  Lateral    Aspirate characteristics:  Cloudy    Steroid injected: no      Specimen collected: yes    Comments:      70 cc of cloudy aspirate was removed.      MDM     The patient´s CBC that was reviewed and interpreted by me shows no abnormalities of critical concern. Of note,  there is no anemia requiring a blood transfusion and the platelet count is acceptable.  BMP shows no electrolyte abnormalities.  Evaluation of synovial fluid does show 63,000 white blood cells.  Patient is currently pending an ESR and CRP.  Patient was started on Zosyn in the emergency department.                Patient Care Considerations:    None      Consultants/Shared Management Plan:    Case was discussed with Dr. Ruff who agrees with admission and will consult.  Patient to be admitted under the care of the hospitalist.     Social Determinants of Health:    Patient is independent, reliable, and has access to care.       Disposition and Care Coordination:    Admit:   Through independent evaluation of the patient's history, physical, and imperical data, the patient meets criteria for inpatient admission to the hospital.        Final diagnoses:   Septic arthritis, due to unspecified organism, septic arthritis of unspecified location        ED Disposition       ED Disposition   Decision to Admit    Condition   --    Comment   --               This medical record created using voice recognition software.

## 2025-07-30 NOTE — TELEPHONE ENCOUNTER
No triage call for this patient. I was looking up patient information for a transfer and accidentally hit patient triage instead.

## 2025-07-30 NOTE — ANESTHESIA PREPROCEDURE EVALUATION
Anesthesia Evaluation     Patient summary reviewed and Nursing notes reviewed   no history of anesthetic complications:   NPO Solid Status: > 8 hours  NPO Liquid Status: > 2 hours           Airway   Dental      Pulmonary    (+) a smoker Current,  Cardiovascular     ECG reviewed        Neuro/Psych  (+) psychiatric history Bipolar, Anxiety and Depression  GI/Hepatic/Renal/Endo    (+) obesity, GERD    Musculoskeletal     (+) chronic pain  Abdominal    Substance History   (+) alcohol use, drug use     OB/GYN          Other        ROS/Med Hx Other: Additional History:  Alcohol and MJ abuse    PSH:                Anesthesia Plan    ASA 2     general     (Patient identified; pre-operative vital signs, all relevant labs/studies, complete medical/surgical/anesthetic history, full medication list, full allergy list, and NPO status obtained/reviewed; physical assessment performed; anesthetic options, side effects, potential complications, risks, and benefits discussed; questions answered; verbal and/or written anesthesia consent obtained; patient cleared for procedure; anesthesia machine and equipment checked and functioning)  intravenous induction     Anesthetic plan, risks, benefits, and alternatives have been provided, discussed and informed consent has been obtained with: patient.    Use of blood products discussed with  Consented to blood products.    Plan discussed with CRNA and CAA.    CODE STATUS:

## 2025-07-30 NOTE — Clinical Note
Level of Care: Med/Surg [1]   Admitting Physician: VIRAJ MAN [[id number]]   Attending Physician: VIRAJ MAN [[id number]]   Patient Class: Inpatient [101]

## 2025-07-31 LAB — URATE FLD-MCNC: 5.7 MG/DL

## 2025-07-31 NOTE — ED NOTES
Report called at [facility] ED to Dirk Gavin RN, patient will go via ambulance, v/s stable, patient no other complaints

## 2025-07-31 NOTE — ED NOTES
Dr. Senior from [facility] Emergency Department accepted the patient.   Patient will be transferred to [facility] Emergency Department for further treatment

## 2025-08-04 LAB
BACTERIA FLD CULT: NORMAL
BACTERIA SPEC AEROBE CULT: NORMAL
BACTERIA SPEC AEROBE CULT: NORMAL
GRAM STN SPEC: NORMAL
GRAM STN SPEC: NORMAL